# Patient Record
Sex: FEMALE | Race: WHITE | URBAN - METROPOLITAN AREA
[De-identification: names, ages, dates, MRNs, and addresses within clinical notes are randomized per-mention and may not be internally consistent; named-entity substitution may affect disease eponyms.]

---

## 2017-09-19 ENCOUNTER — OFFICE VISIT (OUTPATIENT)
Dept: FAMILY MEDICINE | Facility: CLINIC | Age: 32
End: 2017-09-19
Payer: COMMERCIAL

## 2017-09-19 VITALS
TEMPERATURE: 98.2 F | WEIGHT: 119.9 LBS | HEART RATE: 65 BPM | OXYGEN SATURATION: 100 % | HEIGHT: 63 IN | DIASTOLIC BLOOD PRESSURE: 59 MMHG | BODY MASS INDEX: 21.25 KG/M2 | SYSTOLIC BLOOD PRESSURE: 86 MMHG

## 2017-09-19 DIAGNOSIS — Z83.2 FAMILY HISTORY OF PROTHROMBIN GENE MUTATION: Primary | ICD-10-CM

## 2017-09-19 PROCEDURE — 81241 F5 GENE: CPT | Performed by: INTERNAL MEDICINE

## 2017-09-19 PROCEDURE — 36415 COLL VENOUS BLD VENIPUNCTURE: CPT | Performed by: INTERNAL MEDICINE

## 2017-09-19 PROCEDURE — 99203 OFFICE O/P NEW LOW 30 MIN: CPT | Performed by: INTERNAL MEDICINE

## 2017-09-19 PROCEDURE — 81240 F2 GENE: CPT | Performed by: INTERNAL MEDICINE

## 2017-09-19 RX ORDER — BUPROPION HYDROCHLORIDE 200 MG/1
200 TABLET, EXTENDED RELEASE ORAL DAILY
Qty: 60 TABLET | COMMUNITY
Start: 2017-09-19

## 2017-09-19 RX ORDER — ESCITALOPRAM OXALATE 10 MG/1
10 TABLET ORAL DAILY
Qty: 30 TABLET | Refills: 1 | COMMUNITY
Start: 2017-09-19

## 2017-09-19 ASSESSMENT — ENCOUNTER SYMPTOMS
SHORTNESS OF BREATH: 0
HEMOPTYSIS: 0
DIZZINESS: 0

## 2017-09-19 NOTE — NURSING NOTE
"Chief Complaint   Patient presents with     New Patient        Initial BP (!) 86/59 (BP Location: Right arm, Patient Position: Chair, Cuff Size: Adult Regular)  Pulse 65  Temp 98.2  F (36.8  C) (Oral)  Ht 5' 3\" (1.6 m)  Wt 119 lb 14.4 oz (54.4 kg)  LMP 09/04/2017  SpO2 100%  BMI 21.24 kg/m2 Estimated body mass index is 21.24 kg/(m^2) as calculated from the following:    Height as of this encounter: 5' 3\" (1.6 m).    Weight as of this encounter: 119 lb 14.4 oz (54.4 kg)..    BP completed using cuff size: regular  MEDICATIONS REVIEWED  SOCIAL AND FAMILY HX REVIEWED  Orquidea Robb CMA  "

## 2017-09-19 NOTE — LETTER
[address]  [address]  [place]  [address]  Tel: [phone]    September 25, 2017    Marleny Scott  [address] [place] [address]        Dear Ms. Scott,    Good day to you Marleny.    Your test shows that you are negative for the factor II/prothrombin gene mutation.    If you have any further questions or problems, please contact our office.      Sincerely,    Shane Lindo MD/ Sweta RICHARD, CMA  Results for orders placed or performed in visit on 09/19/17   Factor 2 and 5 mutation analysis   Result Value Ref Range    Copath Report       Patient Name: MARLENY SCOTT  MR#: [id number]  Specimen #: [id number]  Collected: 9/19/2017 09:33  Received: 9/20/2017 10:56  Reported: 9/22/2017 15:47  Ordering Phy(s): SHANE LINDO    For improved result formatting, select 'View Enhanced Report Format'  under Linked Documents section.  _________________________________________    TEST(S) REQUESTED:  Factor 5 Leiden and Factor 2 by PCR    SPECIMEN DESCRIPTION:  Blood    METHODOLOGY:   The regions of genomic DNA containing the D3699D Factor 5  gene mutation (Factor V Leiden) and the Factor 2(Prothrombin L97475Q)  gene mutation were simultaneously amplified using the polymerase chain  reaction.  The amplified products were digested with restriction  endonuclease TaqI and products were analyzed by gel electrophoresis.    RESULTS:    Factor V 1691G>A (Leiden)  RESULTS:  Mutation analyzed:     1691G>A  Factor V 1691G>A (Leiden)  Interpretation:      ABSENT  Factor V 1691G>A (Leiden) mutation  genotype:      G/G    FACTOR 2/ PROTHROMBIN RESULTS:  Mutation analyzed:     06530U>A  Factor 2 Mutation Interpretation:      ABSENT  Factor 2 Mutation genotype:      G/G    INTERPRETATION:  The patient is negative for the Factor V 1691G>A (Leiden) and negative  for the Factor 2 mutation.    COMMENTS:  If a patient is the recipient of an allogeneic bone marrow transplant,  this test must be  done on a pre-transplant sample or buccal swab.  A  previous allogeneic bone marrow transplant will interfere with test  results.  Call the [facility]([phone]) for  instructions on sample collection for these patients.    This test was developed and its performance characteristics determined  by the [facility]  [facility]. It has not been cleared or approved by the FDA. The  laboratory is regulated under CLIA as qualified to perform  high-complexity testing. This test is used for clinical purposes. It  should not be regarded as investigational or for research.     A resident/fellow in an accredited training program was involved in the  selection of testing, review of laboratory data, and/or interpretation  of this case.  I, as the senior physician, attest that I: (i) confirmed  appropriate testing, (ii) examined the relevant raw data for the  specimen(s); and (iii) rendered or confirmed the interpretation(s).    Electronically Signed Out By:  KM Avalos    CPT Codes:  A: 22015-K8VWJR, 89838-I5TVOR, -CSFTOA(2)    TESTING LAB LOCATION:  [address] [id number] [phone]    COLLECTION SITE:  Client:  [facility]  Location:  [facility] (S)                 Enclosure: Lab Results

## 2017-09-19 NOTE — MR AVS SNAPSHOT
"              After Visit Summary   2017    Anel Hathaway    MRN: [id number]           Patient Information     Date Of Birth          1985        Visit Information        Provider Department      2017 8:45 AM Rolando Lindo MD [facility]        Today's Diagnoses     Family history of prothrombin gene mutation    -  1      Care Instructions    Wait for doctor to contact you with test results.              Follow-ups after your visit        Who to contact     If you have questions or need follow up information about today's clinic visit or your schedule please contact [facility] directly at [phone].  Normal or non-critical lab and imaging results will be communicated to you by MyChart, letter or phone within 4 business days after the clinic has received the results. If you do not hear from us within 7 days, please contact the clinic through [facility] or phone. If you have a critical or abnormal lab result, we will notify you by phone as soon as possible.  Submit refill requests through [facility] or call your pharmacy and they will forward the refill request to us. Please allow 3 business days for your refill to be completed.          Additional Information About Your Visit        MyChart Information     [facility] lets you send messages to your doctor, view your test results, renew your prescriptions, schedule appointments and more. To sign up, go to [email or web address] . Click on \"Log in\" on the left side of the screen, which will take you to the Welcome page. Then click on \"Sign up Now\" on the right side of the page.     You will be asked to enter the access code listed below, as well as some personal information. Please follow the directions to create your username and password.     Your access code is: [email or web address]  Expires: 2017  9:26 AM     Your access code will  in 90 days. If you need help or a new code, please call your [facility] or " "[phone].        Care EveryWhere ID     This is your Care EveryWhere ID. This could be used by other organizations to access your [facility] medical records  [id number]        Your Vitals Were     Pulse Temperature Height Last Period Pulse Oximetry BMI (Body Mass Index)    65 98.2  F (36.8  C) (Oral) 5' 3\" (1.6 m) 09/04/2017 100% 21.24 kg/m2       Blood Pressure from Last 3 Encounters:   09/19/17 (!) 86/59    Weight from Last 3 Encounters:   09/19/17 119 lb 14.4 oz (54.4 kg)              We Performed the Following     Factor 2 and 5 mutation analysis        Primary Care Provider Office Phone # Fax #    Rolando Blair Lindo [phone] [address] [facility] [address]  [facility] [address]        Equal Access to Services     [facility]: Hadii aad ku hadasho Soomaali, waaxda luqadaha, qaybta kaalmada adeegyada, waxay idiin hayaan adethomas melendez . So [facility] [phone].    ATENCIÓN: Si habla español, tiene a kohli disposición servicios gratuitos de asistencia lingüística. Llame al [phone].    We comply with applicable federal civil rights laws and [place] laws. We do not discriminate on the basis of race, color, national origin, age, disability sex, sexual orientation or gender identity.            Thank you!     Thank you for choosing [facility]  for your care. Our goal is always to provide you with excellent care. Hearing back from our patients is one way we can continue to improve our services. Please take a few minutes to complete the written survey that you may receive in the mail after your visit with us. Thank you!             Your Updated Medication List - Protect others around you: Learn how to safely use, store and throw away your medicines at www.disposemymeds.org.          This list is accurate as of: 9/19/17  9:26 AM.  Always use your most recent med list.                   Brand Name Dispense Instructions for use Diagnosis    buPROPion 200 MG 12 hr tablet "    WELLBUTRIN SR    60 tablet    Take 1 tablet (200 mg) by mouth daily        escitalopram 10 MG tablet    LEXAPRO    30 tablet    Take 1 tablet (10 mg) by mouth daily

## 2017-09-20 NOTE — PROGRESS NOTES
"HPI Comments:   Patient's paternal uncle has a history of blood clots and was reportedly positive for prothrombin mutation.    Patient is wanting to be tested particularly because she is getting  soon and has future plans for conceiving children.  She denies having any history of blood clot formation. She is a nonsmoker. She is on oral contraceptives.    The rest of her family is currently being tested, but there is no history of blood clots in her immediate family.      Past medical history: (-)ve for DVT/PE      Review of Systems   Respiratory: Negative for hemoptysis and shortness of breath.    Cardiovascular: Negative for chest pain and leg swelling.   Skin: Negative for rash.   Neurological: Negative for dizziness.       BP (!) 86/59 (BP Location: Right arm, Patient Position: Chair, Cuff Size: Adult Regular)  Pulse 65  Temp 98.2  F (36.8  C) (Oral)  Ht 5' 3\" (1.6 m)  Wt 119 lb 14.4 oz (54.4 kg)  LMP 09/04/2017  SpO2 100%  BMI 21.24 kg/m2      Physical Exam   Constitutional: She is oriented to person, place, and time. No distress.   Neck: No thyromegaly present.   Cardiovascular: Normal rate, regular rhythm and normal heart sounds.    Pulmonary/Chest: Effort normal and breath sounds normal. No respiratory distress.   Musculoskeletal: She exhibits no edema.   Neurological: She is alert and oriented to person, place, and time. GCS score is 15.   Vitals reviewed.        ICD-10-CM    1. Family history of prothrombin gene mutation Z83.2 Factor 2 and 5 mutation analysis         Patient Instructions   Wait for doctor to contact you with test results.            "

## 2017-09-22 LAB — COPATH REPORT: NORMAL

## 2018-01-21 ENCOUNTER — HEALTH MAINTENANCE LETTER (OUTPATIENT)
Age: 33
End: 2018-01-21